# Patient Record
Sex: FEMALE | URBAN - METROPOLITAN AREA
[De-identification: names, ages, dates, MRNs, and addresses within clinical notes are randomized per-mention and may not be internally consistent; named-entity substitution may affect disease eponyms.]

---

## 2024-01-05 ENCOUNTER — APPOINTMENT (OUTPATIENT)
Dept: URBAN - METROPOLITAN AREA SURGERY 16 | Age: 54
Setting detail: DERMATOLOGY
End: 2024-01-10

## 2024-01-05 DIAGNOSIS — Z41.9 ENCOUNTER FOR PROCEDURE FOR PURPOSES OTHER THAN REMEDYING HEALTH STATE, UNSPECIFIED: ICD-10-CM

## 2024-01-05 PROCEDURE — OTHER FILLERS: OTHER

## 2024-01-05 PROCEDURE — OTHER DYSPORT (U OR CC) ADDITIVE: OTHER

## 2024-01-05 PROCEDURE — OTHER DYSPORT (U OR CC): OTHER

## 2024-01-05 PROCEDURE — OTHER SCULPTRA: OTHER

## 2024-01-05 ASSESSMENT — LOCATION ZONE DERM
LOCATION ZONE: LIP
LOCATION ZONE: FACE

## 2024-01-05 ASSESSMENT — LOCATION DETAILED DESCRIPTION DERM
LOCATION DETAILED: LEFT LOWER CUTANEOUS LIP
LOCATION DETAILED: LEFT SUPERIOR CENTRAL MALAR CHEEK
LOCATION DETAILED: LEFT MID TEMPLE
LOCATION DETAILED: GLABELLA
LOCATION DETAILED: RIGHT INFERIOR MEDIAL FOREHEAD
LOCATION DETAILED: RIGHT MEDIAL EYEBROW
LOCATION DETAILED: LEFT CENTRAL EYEBROW
LOCATION DETAILED: RIGHT MEDIAL BUCCAL CHEEK
LOCATION DETAILED: RIGHT SUPERIOR LATERAL BUCCAL CHEEK
LOCATION DETAILED: RIGHT INFERIOR TEMPLE
LOCATION DETAILED: LEFT INFERIOR LATERAL MALAR CHEEK
LOCATION DETAILED: LEFT MEDIAL EYEBROW

## 2024-01-05 ASSESSMENT — LOCATION SIMPLE DESCRIPTION DERM
LOCATION SIMPLE: RIGHT CHEEK
LOCATION SIMPLE: GLABELLA
LOCATION SIMPLE: RIGHT TEMPLE
LOCATION SIMPLE: LEFT CHEEK
LOCATION SIMPLE: LEFT EYEBROW
LOCATION SIMPLE: RIGHT FOREHEAD
LOCATION SIMPLE: RIGHT EYEBROW
LOCATION SIMPLE: LEFT LIP
LOCATION SIMPLE: LEFT TEMPLE

## 2024-01-05 NOTE — PROCEDURE: DYSPORT (U OR CC)
Expiration Date (Month Year): 07/31/2025
Consent: Written consent obtained. Risks include but not limited to lid/brow ptosis, bruising, swelling, diplopia, temporary effect, incomplete chemical denervation.
Measure In Units Or Cc's?: units
Lot #: [id number]
Detail Level: Detailed
Show Inventory Tab: Hide
Price Per Unit Or Per Cc In $ (Use Numbers Only, No Special Characters Or $): 4.5
Post-Care Instructions: Patient instructed to not lie down for 4 hours and limit physical activity for 24 hours.
Dilution (U/0.1 Cc): 15
Quantity Per Injection Site (Units): 7.5

## 2024-01-05 NOTE — PROCEDURE: FILLERS
Temple Hollows Filler Volume In Cc: 0
Map Statment: See Attach Map for Complete Details
Filler: Restylane Defyne
Cheeks Filler Volume In Cc: 0.8
Include Cannula Information In Note?: No
Include Documentation That Aspiration Was Performed Prior To Injecting Filler:: Yes
Post-Care Instructions: After the procedure, patient instructed to apply ice to reduce swelling.
Consent: Written consent obtained. Risks include but not limited to bruising, beading, irregular texture, ulceration, infection, allergic reaction, scar formation, incomplete augmentation, temporary nature, and procedural pain.
Aspiration Statement: Aspiration was performed prior to injecting site with filler.
Lot #: [id number]
Expiration Date (Month Year): 10/31/2024
Anesthesia Type: 1% lidocaine with epinephrine
Lot #: [id number]
Expiration Date (Month Year): 2/28/23
Detail Level: Detailed
Additional Area 1 Location: pre-jowl sulcus
Additional Area 1 Volume In Cc: 0.2
Price (Use Numbers Only, No Special Characters Or $): 956
Additional Area 1 Location: Prechin sulcus

## 2024-01-05 NOTE — PROCEDURE: SCULPTRA
Left Tear Trough Filler Volume In Cc: 0
Show Right And Left Pa Volume?: No
Show Fifth Additional Location?: Yes
Detail Level: Detailed
Price (Use Numbers Only, No Special Characters Or $): 563
Show Inventory Tab: Hide
Jawline Sculptra Filler Volume In Cc: 0.5
Dilution Method: The Sculptra was diluted with 8 ml of sterile water and 1 ml 1% lidocaine for a total volume of 9ccs for each vial.
Anesthesia Type: 1% lidocaine with epinephrine
Consent: Written consent obtained. Risks include but not limited to bruising, beading, irregular texture, ulceration, infection, allergic reaction, scar formation, incomplete augmentation, temporary nature, procedural pain.
Injection Technique: The Sculptra was injected to the listed areas after cleansing the skin and providing appropriate anesthesia.
Temple [place]s Filler Volume In Cc: 4
Mental Crease Filler Volume In Cc: 1
Lot #: [id number]
Post-Care Instructions: Patient instructed to apply ice to reduce swelling.
Additional Anesthesia Volume In Cc: 6
Map Statement: See Attached Map for Complete Details.
Expiration Date (Month Year): 04/30/2026
Lateral Face Sculptra Filler Volume In Cc: 2